# Patient Record
Sex: FEMALE | Race: WHITE | Employment: FULL TIME | URBAN - METROPOLITAN AREA
[De-identification: names, ages, dates, MRNs, and addresses within clinical notes are randomized per-mention and may not be internally consistent; named-entity substitution may affect disease eponyms.]

---

## 2021-02-21 ENCOUNTER — HOSPITAL ENCOUNTER (EMERGENCY)
Facility: CLINIC | Age: 56
Discharge: HOME | End: 2021-02-21
Attending: EMERGENCY MEDICINE | Admitting: EMERGENCY MEDICINE
Payer: COMMERCIAL

## 2021-02-21 ENCOUNTER — APPOINTMENT (OUTPATIENT)
Dept: CT IMAGING | Facility: CLINIC | Age: 56
End: 2021-02-21
Attending: EMERGENCY MEDICINE
Payer: COMMERCIAL

## 2021-02-21 ENCOUNTER — HOSPITAL ENCOUNTER (EMERGENCY)
Facility: CLINIC | Age: 56
Discharge: HOME | End: 2021-02-22
Attending: EMERGENCY MEDICINE
Payer: COMMERCIAL

## 2021-02-21 VITALS
WEIGHT: 131 LBS | DIASTOLIC BLOOD PRESSURE: 81 MMHG | SYSTOLIC BLOOD PRESSURE: 133 MMHG | TEMPERATURE: 97.6 F | HEART RATE: 80 BPM | OXYGEN SATURATION: 98 % | RESPIRATION RATE: 20 BRPM

## 2021-02-21 DIAGNOSIS — J12.82 PNEUMONIA DUE TO 2019 NOVEL CORONAVIRUS: ICD-10-CM

## 2021-02-21 DIAGNOSIS — R11.2 NON-INTRACTABLE VOMITING WITH NAUSEA, UNSPECIFIED VOMITING TYPE: ICD-10-CM

## 2021-02-21 DIAGNOSIS — I26.99 OTHER ACUTE PULMONARY EMBOLISM WITHOUT ACUTE COR PULMONALE (H): ICD-10-CM

## 2021-02-21 DIAGNOSIS — I26.94 MULTIPLE SUBSEGMENTAL PULMONARY EMBOLI WITHOUT ACUTE COR PULMONALE (H): ICD-10-CM

## 2021-02-21 DIAGNOSIS — U07.1 PNEUMONIA DUE TO 2019 NOVEL CORONAVIRUS: ICD-10-CM

## 2021-02-21 LAB
ALBUMIN SERPL-MCNC: 3.7 G/DL (ref 3.4–5)
ALP SERPL-CCNC: 71 U/L (ref 40–150)
ALT SERPL W P-5'-P-CCNC: 17 U/L (ref 0–50)
ANION GAP SERPL CALCULATED.3IONS-SCNC: 7 MMOL/L (ref 3–14)
AST SERPL W P-5'-P-CCNC: 18 U/L (ref 0–45)
BASOPHILS # BLD AUTO: 0 10E9/L (ref 0–0.2)
BASOPHILS NFR BLD AUTO: 0.4 %
BILIRUB SERPL-MCNC: 0.7 MG/DL (ref 0.2–1.3)
BUN SERPL-MCNC: 8 MG/DL (ref 7–30)
CALCIUM SERPL-MCNC: 9 MG/DL (ref 8.5–10.1)
CHLORIDE SERPL-SCNC: 103 MMOL/L (ref 94–109)
CO2 SERPL-SCNC: 28 MMOL/L (ref 20–32)
CREAT BLD-MCNC: 0.5 MG/DL (ref 0.52–1.04)
CREAT SERPL-MCNC: 0.59 MG/DL (ref 0.52–1.04)
DIFFERENTIAL METHOD BLD: NORMAL
EOSINOPHIL # BLD AUTO: 0 10E9/L (ref 0–0.7)
EOSINOPHIL NFR BLD AUTO: 0.3 %
ERYTHROCYTE [DISTWIDTH] IN BLOOD BY AUTOMATED COUNT: 11.7 % (ref 10–15)
GFR SERPL CREATININE-BSD FRML MDRD: >90 ML/MIN/{1.73_M2}
GFR SERPL CREATININE-BSD FRML MDRD: >90 ML/MIN/{1.73_M2}
GLUCOSE SERPL-MCNC: 111 MG/DL (ref 70–99)
HCT VFR BLD AUTO: 43.6 % (ref 35–47)
HGB BLD-MCNC: 14.3 G/DL (ref 11.7–15.7)
IMM GRANULOCYTES # BLD: 0 10E9/L (ref 0–0.4)
IMM GRANULOCYTES NFR BLD: 0.4 %
LYMPHOCYTES # BLD AUTO: 1.5 10E9/L (ref 0.8–5.3)
LYMPHOCYTES NFR BLD AUTO: 16.2 %
MCH RBC QN AUTO: 32.5 PG (ref 26.5–33)
MCHC RBC AUTO-ENTMCNC: 32.8 G/DL (ref 31.5–36.5)
MCV RBC AUTO: 99 FL (ref 78–100)
MONOCYTES # BLD AUTO: 0.7 10E9/L (ref 0–1.3)
MONOCYTES NFR BLD AUTO: 7.6 %
NEUTROPHILS # BLD AUTO: 7.1 10E9/L (ref 1.6–8.3)
NEUTROPHILS NFR BLD AUTO: 75.1 %
NRBC # BLD AUTO: 0 10*3/UL
NRBC BLD AUTO-RTO: 0 /100
NT-PROBNP SERPL-MCNC: 59 PG/ML (ref 0–900)
PLATELET # BLD AUTO: 213 10E9/L (ref 150–450)
POTASSIUM SERPL-SCNC: 3.7 MMOL/L (ref 3.4–5.3)
PROT SERPL-MCNC: 8.3 G/DL (ref 6.8–8.8)
RBC # BLD AUTO: 4.4 10E12/L (ref 3.8–5.2)
SODIUM SERPL-SCNC: 138 MMOL/L (ref 133–144)
TROPONIN I SERPL-MCNC: <0.015 UG/L (ref 0–0.04)
WBC # BLD AUTO: 9.4 10E9/L (ref 4–11)

## 2021-02-21 PROCEDURE — 258N000003 HC RX IP 258 OP 636: Performed by: EMERGENCY MEDICINE

## 2021-02-21 PROCEDURE — 96374 THER/PROPH/DIAG INJ IV PUSH: CPT | Mod: 59

## 2021-02-21 PROCEDURE — 71275 CT ANGIOGRAPHY CHEST: CPT

## 2021-02-21 PROCEDURE — 83880 ASSAY OF NATRIURETIC PEPTIDE: CPT | Performed by: EMERGENCY MEDICINE

## 2021-02-21 PROCEDURE — 250N000009 HC RX 250: Performed by: EMERGENCY MEDICINE

## 2021-02-21 PROCEDURE — 82565 ASSAY OF CREATININE: CPT | Mod: 91

## 2021-02-21 PROCEDURE — 85025 COMPLETE CBC W/AUTO DIFF WBC: CPT | Performed by: EMERGENCY MEDICINE

## 2021-02-21 PROCEDURE — 93005 ELECTROCARDIOGRAM TRACING: CPT

## 2021-02-21 PROCEDURE — 99285 EMERGENCY DEPT VISIT HI MDM: CPT | Mod: 25

## 2021-02-21 PROCEDURE — 250N000011 HC RX IP 250 OP 636: Performed by: EMERGENCY MEDICINE

## 2021-02-21 PROCEDURE — 80053 COMPREHEN METABOLIC PANEL: CPT | Performed by: EMERGENCY MEDICINE

## 2021-02-21 PROCEDURE — 84484 ASSAY OF TROPONIN QUANT: CPT | Performed by: EMERGENCY MEDICINE

## 2021-02-21 PROCEDURE — 99284 EMERGENCY DEPT VISIT MOD MDM: CPT

## 2021-02-21 PROCEDURE — 96361 HYDRATE IV INFUSION ADD-ON: CPT

## 2021-02-21 RX ORDER — IOPAMIDOL 755 MG/ML
500 INJECTION, SOLUTION INTRAVASCULAR ONCE
Status: COMPLETED | OUTPATIENT
Start: 2021-02-21 | End: 2021-02-21

## 2021-02-21 RX ORDER — ONDANSETRON 2 MG/ML
4 INJECTION INTRAMUSCULAR; INTRAVENOUS EVERY 30 MIN PRN
Status: DISCONTINUED | OUTPATIENT
Start: 2021-02-21 | End: 2021-02-21 | Stop reason: HOSPADM

## 2021-02-21 RX ORDER — RIVAROXABAN 15 MG-20MG
15 KIT ORAL 2 TIMES DAILY
Qty: 51 EACH | Refills: 0 | Status: SHIPPED | OUTPATIENT
Start: 2021-02-21

## 2021-02-21 RX ORDER — ONDANSETRON 4 MG/1
4 TABLET, ORALLY DISINTEGRATING ORAL EVERY 8 HOURS PRN
Qty: 10 TABLET | Refills: 0 | Status: SHIPPED | OUTPATIENT
Start: 2021-02-21 | End: 2021-02-24

## 2021-02-21 RX ADMIN — SODIUM CHLORIDE 1000 ML: 9 INJECTION, SOLUTION INTRAVENOUS at 10:29

## 2021-02-21 RX ADMIN — SODIUM CHLORIDE 84 ML: 9 INJECTION, SOLUTION INTRAVENOUS at 10:50

## 2021-02-21 RX ADMIN — IOPAMIDOL 61 ML: 755 INJECTION, SOLUTION INTRAVENOUS at 10:50

## 2021-02-21 RX ADMIN — ONDANSETRON 4 MG: 2 INJECTION INTRAMUSCULAR; INTRAVENOUS at 10:30

## 2021-02-21 ASSESSMENT — ENCOUNTER SYMPTOMS
FEVER: 0
WEAKNESS: 1
VOMITING: 0
COUGH: 1
ABDOMINAL PAIN: 0
SHORTNESS OF BREATH: 1

## 2021-02-21 NOTE — DISCHARGE INSTRUCTIONS
Start the blood thinner medication today  Return to ED if develop chest pain, worsening shortness of breath  Follow up with primary care provider  No aspirin or ibuprofen while on blood thinner

## 2021-02-21 NOTE — ED TRIAGE NOTES
Pt presents to ED with nausea, poor appetite and increased SOB on exertion. Recently diagnosed with COVID. Had a ddimer of 6.6 at [facility] and referred to ED for further evaluation. Hx PE not currently on blood thinners.

## 2021-02-21 NOTE — ED PROVIDER NOTES
History   Chief Complaint:  Covid Concern       HPI   Aida Sanon is a 55 year old female with history of pulmonary embolism who presents with Covid-19 concern. The patient reported that she has had symptoms since  and tested positive for Covid-19 on . Her worse symptom was the cough. She felt she was improving until this morning her cough was worse, she felt generally weak and she was short of breath with exertion. She first presented to [facility] urgent care and had the below workup before they recommended follow up in the ED. She denied any fever, chest pain, vomiting or abdominal pain. No leg pain or leg swelling.    D Dimer: 6.63 (H)     XR Chest 2 views:   There are patchy alveolar opacities in the left lower lung, new since the prior study and concerning for pneumonia. The cardiac silhouette and pulmonary vasculature are within normal limits. The pleural spaces are clear. The visualized bony structures are intact, As per radiology.       Review of Systems   Constitutional: Negative for fever.   Respiratory: Positive for cough and shortness of breath (with exertion).    Cardiovascular: Negative for chest pain.   Gastrointestinal: Negative for abdominal pain and vomiting.   Neurological: Positive for weakness.   All other systems reviewed and are negative.    Allergies:  The patient has no known allergies.     Medications:  The patient is currently on no regular medications.    Past Medical History:    pulmonary embolism-2010  hyperlipidemia     Past Surgical History:     section     Family History:    hypertension   Breast cancer   Ovarian cancer     Social History:  Presents to the ED: Unaccompanied   Tobacco use: Former, quit     Physical Exam     Patient Vitals for the past 24 hrs:   BP Temp Temp src Pulse Resp SpO2 Weight   21 1200 134/77 -- -- 87 21 98 % --   21 1115 (!) 141/75 -- -- 90 16 98 % --   21 1100 -- -- -- 87 -- 98 % --   21 1045 135/86 -- --  89 22 98 % --   02/21/21 1030 -- -- -- 87 14 98 % --   02/21/21 1015 (!) 152/87 -- -- -- -- -- --   02/21/21 1007 (!) 147/93 97.6  F (36.4  C) Oral -- -- -- --   02/21/21 1006 -- -- -- -- -- -- 59.4 kg (131 lb)   02/21/21 1004 -- -- -- 103 18 98 % --       Physical Exam  General: Sitting up in bed  Eyes:  The pupils are equal and round    Conjunctivae and sclerae are normal  ENT:    Wearing a mask  Neck:  Normal range of motion  CV:  Regular rate, regular rhythm     Skin warm and well perfused   Resp:  Non labored breathing on room air    No tachypnea    No cough heard    Lungs clear bilaterally  GI:  Abdomen is soft, there is no rigidity    No distension    No rebound tenderness     No abdominal tenderness  MS:  No leg swelling  Skin:  No rash or acute skin lesions noted  Neuro:   Awake, alert.      Speech is normal and fluent.    Face is symmetric.     Moves all extremities equally  Psych: Normal affect.  Appropriate interactions.    Emergency Department Course     ECG  ECG taken at 1016, ECG read at 1024  Normal sinus rhythm. Normal ECG.  No previous ECG for comparison.   Rate 92 bpm. PA interval 136 ms. QRS duration 768 ms. QT/QTc 342/422 ms. P-R-T axes 28 -3 65.     Imaging:    CT Chest pulmonary embolism w/ IV contrast:   1.  Positive for acute pulmonary emboli in bilateral segmental and   subsegmental pulmonary arteries. No right heart strain.   2.  Multifocal patchy groundglass opacities in the left lung are   likely secondary to atypical pneumonia. Covid 19 pneumonia is in the   differential. This is less likely developing pulmonary infarct, as per radiology.       Laboratory:  CBC: WBC: 9.4, HGB: 14.3, PLT: 213  CMP: Glucose 111 (H),  o/w WNL (Creatinine: 0.59)    1021    Troponin: <0.015    BNP: 59   Creatinine POCT: 0.5 (L)       Emergency Department Course:    Reviewed:  I reviewed the patient's nursing notes, vitals, past medical history and care everywhere.     Assessments:  1016 I obtained history  and examined the patient as noted above.   1135 I rechecked the patient and explained findings.   1301 On an ambulation trial, the patient was 96% on RA while walking around her room.    Consults:   1129 I consulted with Dr. Delgado, Radiology, regarding their interpretation of the patient's imaging studies.     Interventions:  1029 NS 1L IV   1030 Zofran 4 mg IV     Disposition:  The patient was discharged to home.       Impression & Plan     Medical Decision Making:  Aida Sanon is a 55 year old female who presented to the ED with concern for elevated d-dimer. Patient actually thought she was getting better with covid but had some nausea and worsening cough today. D-dimer elevated at . Labs in ED unremarkable. EKG with no acute ischemic changes. CT chest obtained that showed bilateral PE with likely pneumonia related to covid. Less likely pulmonary infarct. Radiologist did not feel that this looked like bacterial pneumonia. Patient's PESI Score: Class I, very low risk. She has no leg symptoms to suggest DVT. She is not hypoxic at rest or ambulation. Breathing comfortably in bed and does not appear short of breath. No evidence of heart strain on imaging of labs. Discussed hospitalization versus home and given her well appearance and low risk PESI score, will discharge. Patient comfortable with this plan. Had been on coumadin for previous PE but will do xarelto this time. Discussed bleeding risks of this medication. Discussed with pharmacist and she can get one month free of xarelto. She does not have health insurance until March 1. Discussed that she needs follow-up with PCP as soon as possible. Discussed returning to ED if develops worsening shortness of breath, chest pain, etc.     Covid-19  Aida Sanon was evaluated during a global COVID-19 pandemic, which necessitated consideration that the patient might be at risk for infection with the SARS-CoV-2 virus that causes COVID-19.   Applicable protocols for  evaluation were followed during the patient's care.   COVID-19 was considered as part of the patient's evaluation. The plan for testing is:  a test was obtained at a previous visit and reviewed & considered today.    Diagnosis:    ICD-10-CM    1. Pneumonia due to 2019 novel coronavirus  U07.1 Nt probnp inpatient    J12.82    2. Multiple subsegmental pulmonary emboli without acute cor pulmonale (H)  I26.94        Discharge Medications:  New Prescriptions    ONDANSETRON (ZOFRAN ODT) 4 MG ODT TAB    Take 1 tablet (4 mg) by mouth every 8 hours as needed    RIVAROXABAN ANTICOAGULANT (XARELTO STARTER PACK ANTICOAGULANT) 15 & 20 MG TBPK    Take 15 mg by mouth 2 times daily With food for 21 days followed by 20 mg once daily with food       Scribe Disclosure:  Zeny LATHAM, am serving as a scribe at 10:15 AM on 2/21/2021 to document services personally performed by Bibi Walters MD based on my observations and the provider's statements to me.        Bibi Walters MD  02/21/21 1954

## 2021-02-22 VITALS
SYSTOLIC BLOOD PRESSURE: 140 MMHG | DIASTOLIC BLOOD PRESSURE: 83 MMHG | OXYGEN SATURATION: 96 % | HEART RATE: 87 BPM | RESPIRATION RATE: 21 BRPM | TEMPERATURE: 99.3 F

## 2021-02-22 LAB
ANION GAP SERPL CALCULATED.3IONS-SCNC: 6 MMOL/L (ref 3–14)
BASOPHILS # BLD AUTO: 0 10E9/L (ref 0–0.2)
BASOPHILS NFR BLD AUTO: 0.4 %
BUN SERPL-MCNC: 7 MG/DL (ref 7–30)
CALCIUM SERPL-MCNC: 8.5 MG/DL (ref 8.5–10.1)
CHLORIDE SERPL-SCNC: 104 MMOL/L (ref 94–109)
CO2 SERPL-SCNC: 28 MMOL/L (ref 20–32)
CREAT SERPL-MCNC: 0.55 MG/DL (ref 0.52–1.04)
DIFFERENTIAL METHOD BLD: NORMAL
EOSINOPHIL # BLD AUTO: 0 10E9/L (ref 0–0.7)
EOSINOPHIL NFR BLD AUTO: 0.4 %
ERYTHROCYTE [DISTWIDTH] IN BLOOD BY AUTOMATED COUNT: 11.5 % (ref 10–15)
GFR SERPL CREATININE-BSD FRML MDRD: >90 ML/MIN/{1.73_M2}
GLUCOSE SERPL-MCNC: 119 MG/DL (ref 70–99)
HCT VFR BLD AUTO: 40.8 % (ref 35–47)
HGB BLD-MCNC: 13.7 G/DL (ref 11.7–15.7)
IMM GRANULOCYTES # BLD: 0 10E9/L (ref 0–0.4)
IMM GRANULOCYTES NFR BLD: 0.3 %
INTERPRETATION ECG - MUSE: NORMAL
INTERPRETATION ECG - MUSE: NORMAL
LYMPHOCYTES # BLD AUTO: 1.4 10E9/L (ref 0.8–5.3)
LYMPHOCYTES NFR BLD AUTO: 15.2 %
MCH RBC QN AUTO: 32.9 PG (ref 26.5–33)
MCHC RBC AUTO-ENTMCNC: 33.6 G/DL (ref 31.5–36.5)
MCV RBC AUTO: 98 FL (ref 78–100)
MONOCYTES # BLD AUTO: 0.7 10E9/L (ref 0–1.3)
MONOCYTES NFR BLD AUTO: 7.9 %
NEUTROPHILS # BLD AUTO: 6.7 10E9/L (ref 1.6–8.3)
NEUTROPHILS NFR BLD AUTO: 75.8 %
NRBC # BLD AUTO: 0 10*3/UL
NRBC BLD AUTO-RTO: 0 /100
PLATELET # BLD AUTO: 197 10E9/L (ref 150–450)
POTASSIUM SERPL-SCNC: 3.8 MMOL/L (ref 3.4–5.3)
RBC # BLD AUTO: 4.16 10E12/L (ref 3.8–5.2)
SODIUM SERPL-SCNC: 138 MMOL/L (ref 133–144)
TSH SERPL DL<=0.005 MIU/L-ACNC: 1.29 MU/L (ref 0.4–4)
WBC # BLD AUTO: 8.9 10E9/L (ref 4–11)

## 2021-02-22 PROCEDURE — 250N000011 HC RX IP 250 OP 636: Performed by: EMERGENCY MEDICINE

## 2021-02-22 PROCEDURE — 96376 TX/PRO/DX INJ SAME DRUG ADON: CPT

## 2021-02-22 PROCEDURE — 93005 ELECTROCARDIOGRAM TRACING: CPT

## 2021-02-22 PROCEDURE — 84443 ASSAY THYROID STIM HORMONE: CPT | Performed by: EMERGENCY MEDICINE

## 2021-02-22 PROCEDURE — 85025 COMPLETE CBC W/AUTO DIFF WBC: CPT | Performed by: EMERGENCY MEDICINE

## 2021-02-22 PROCEDURE — 96374 THER/PROPH/DIAG INJ IV PUSH: CPT

## 2021-02-22 PROCEDURE — 96375 TX/PRO/DX INJ NEW DRUG ADDON: CPT

## 2021-02-22 PROCEDURE — 96361 HYDRATE IV INFUSION ADD-ON: CPT

## 2021-02-22 PROCEDURE — 258N000003 HC RX IP 258 OP 636: Performed by: EMERGENCY MEDICINE

## 2021-02-22 PROCEDURE — 80048 BASIC METABOLIC PNL TOTAL CA: CPT | Performed by: EMERGENCY MEDICINE

## 2021-02-22 RX ORDER — METOCLOPRAMIDE 10 MG/1
10 TABLET ORAL
Qty: 12 TABLET | Refills: 0 | Status: SHIPPED | OUTPATIENT
Start: 2021-02-22 | End: 2021-02-25

## 2021-02-22 RX ORDER — DIPHENHYDRAMINE HYDROCHLORIDE 50 MG/ML
25 INJECTION INTRAMUSCULAR; INTRAVENOUS ONCE
Status: COMPLETED | OUTPATIENT
Start: 2021-02-22 | End: 2021-02-22

## 2021-02-22 RX ORDER — ONDANSETRON 2 MG/ML
4 INJECTION INTRAMUSCULAR; INTRAVENOUS
Status: DISCONTINUED | OUTPATIENT
Start: 2021-02-22 | End: 2021-02-22 | Stop reason: HOSPADM

## 2021-02-22 RX ORDER — METOCLOPRAMIDE HYDROCHLORIDE 5 MG/ML
10 INJECTION INTRAMUSCULAR; INTRAVENOUS ONCE
Status: COMPLETED | OUTPATIENT
Start: 2021-02-22 | End: 2021-02-22

## 2021-02-22 RX ADMIN — SODIUM CHLORIDE 1000 ML: 9 INJECTION, SOLUTION INTRAVENOUS at 00:23

## 2021-02-22 RX ADMIN — ONDANSETRON 4 MG: 2 INJECTION INTRAMUSCULAR; INTRAVENOUS at 01:32

## 2021-02-22 RX ADMIN — METOCLOPRAMIDE HYDROCHLORIDE 10 MG: 5 INJECTION INTRAMUSCULAR; INTRAVENOUS at 01:33

## 2021-02-22 RX ADMIN — DIPHENHYDRAMINE HYDROCHLORIDE 25 MG: 50 INJECTION INTRAMUSCULAR; INTRAVENOUS at 01:33

## 2021-02-22 RX ADMIN — ONDANSETRON 4 MG: 2 INJECTION INTRAMUSCULAR; INTRAVENOUS at 00:23

## 2021-02-22 ASSESSMENT — ENCOUNTER SYMPTOMS
NAUSEA: 1
FEVER: 0
DIZZINESS: 0
PALPITATIONS: 1
SHORTNESS OF BREATH: 0
COUGH: 0

## 2021-02-22 NOTE — ED PROVIDER NOTES
History     Chief Complaint:  Palpitations      HPI  Aida Sanon is a 55 year old female with a history of PE, anticoagulated who presents to the emergency department for evaluation of palpitations. Patient was diagnosed with COVID on 2/11 and has since had improvement in her cough and malaise. This morning patient began having palpitations and nausea, prompting her to present to Urgent Care. There, she had an elevated d-dimer and was sent here for further evaluation. Here, patient was diagnosed with PE and was started on Xarelto. Patient does have a history of PE and was previously on Coumadin. Since discharge patient took her first dose of Xarelto. She continued to have palpitations and nausea, prompting her to come back to be evaluated. Patient expresses concern for side effects from the Xarelto or dehydration. She denies chest pain, shortness of breath, dizziness, fever, and cough.    Review of Systems   Constitutional: Negative for fever.   Respiratory: Negative for cough and shortness of breath.    Cardiovascular: Positive for palpitations. Negative for chest pain.   Gastrointestinal: Positive for nausea.   Neurological: Negative for dizziness.   All other systems reviewed and are negative.    Allergies:  No known drug allergies    Medications:    Xarelto    Past Medical History:    HLD  PE  COVID    Past Surgical History:    C section    Family History:    HTN  Breast cancer  Ovarian cancer    Social History:  The patient presents to the emergency department alone.    Physical Exam     Patient Vitals for the past 24 hrs:   BP Temp Pulse Resp SpO2   02/22/21 0045 138/76 -- 92 19 98 %   02/21/21 2351 (!) 144/88 99.3  F (37.4  C) 103 18 94 %         Physical Exam  Constitutional: Alert, attentive  HENT:    Nose: Nose normal.    Mouth/Throat: Oropharynx is clear, mucous membranes are moist   Eyes: EOM are normal.   CV: tachycardic, regular rhythm; no murmurs, rubs or gallups  Chest: Effort normal and  breath sounds normal.   GI:  There is no tenderness. No distension. Normal bowel sounds  MSK: Normal range of motion.   Neurological: Alert, attentive  Skin: Skin is warm and dry.        Emergency Department Course   ECG  ECG taken at 0001, ECG read at 0003  Sinus tachycardia. Otherwise normal ECG.  No change as compared to prior, dated 2/22//21.  Rate 106 bpm. MT interval 156 ms. QRS duration 78 ms. QT/QTc 330/438 ms. P-R-T axes 49 -3 44.     Laboratory:  CBC: WBC: 8.9, HGB: 13.7, PLT: 197  BMP: Glucose 119 (H), o/w WNL (Creatinine: 0.55)  TSH with Free T4 Reflex: 1.29    Emergency Department Course:  Reviewed:  I reviewed the patient's nursing notes, vitals, past medical records, Care Everywhere.     Assessments:  2357 I assessed the patient. Exam findings described above.    215 I reassessed and updated the patient.     Interventions:  0023 NS 1000 mL IV  0023 Zofran 4 mg IV  0132 Zofran 4 mg IV  0133 Benadryl 25 mg IV  0133 Reglan 10 mg IV     Disposition:  Discharged to home.    Impression & Plan    Medical Decision Making:  This is a 55-year-old female with recent diagnosis of COVID-19, complicated with diagnosis today of pulmonary embolism (no longer on Coumadin for remote history of VTE), who presents for evaluation of palpitations recurrent nausea. Of note, her presentation earlier today included palpitations and nausea, and she was improved with supportive cares. She has a normal cardiopulmonary exam aside from mild tachycardia here. This is resolved with fluid rehydration. Her sense of nausea is not associated with fever, vomiting, abdominal pain, or other acute processes. Suspect this may be related to documented Covid pneumonia and/or PE. Symptoms are dramatically improved after Reglan. Screening labs show no electrolyte abnormality, hematologic abnormality, or thyroid function abnormality to contribute or explain her symptoms otherwise. Plan supportive cares at home with Reglan, continue on Xarelto (as  it is doubtful that this contributes to her presentation), and primary care follow-up in 2 to 3 days. Return precautions for shortness of breath, chest pain, or any other concerns.    Covid-19  Aida Sanon was evaluated during a global COVID-19 pandemic, which necessitated consideration that the patient might be at risk for infection with the SARS-CoV-2 virus that causes COVID-19.   Applicable protocols for evaluation were followed during the patient's care.   COVID-19 was considered as part of the patient's evaluation. The plan for testing is:  Patient tested positive on 2/11.    Diagnosis:    ICD-10-CM    1. Non-intractable vomiting with nausea, unspecified vomiting type  R11.2    2. Other acute pulmonary embolism without acute cor pulmonale (H)  I26.99        Discharge Medications:  New Prescriptions    METOCLOPRAMIDE (REGLAN) 10 MG TABLET    Take 1 tablet (10 mg) by mouth 4 times daily (before meals and nightly) for 3 days         Dashawn Hernandez  2/21/2021   EMERGENCY DEPARTMENT  Scribe Disclosure:  I, Dashawn Hernandez, am serving as a scribe at 11:57 PM on 2/21/2021 to document services personally performed by Nash Soni MD based on my observations and the provider's statements to me.          Nash Soni MD  02/22/21 0339

## 2021-02-22 NOTE — ED TRIAGE NOTES
Seen earlier today dx pneumonia, PE and prescribed anticoagulant   Positive COVID 2/11     C/o palpitations, burning sensation bilateral hands. Appears to be anxious . Palpitations worse when laying down .   Oxygen saturation decreases while talking from 95% to 93% RA.     Denies SOB